# Patient Record
Sex: FEMALE | Race: WHITE | NOT HISPANIC OR LATINO | URBAN - METROPOLITAN AREA
[De-identification: names, ages, dates, MRNs, and addresses within clinical notes are randomized per-mention and may not be internally consistent; named-entity substitution may affect disease eponyms.]

---

## 2017-11-17 NOTE — PATIENT DISCUSSION
Discussed the difficulty of selecting IOL's in previous refractive surgery patients and discussed the possibility of refractive surprise and possible IOL exchange.

## 2017-11-17 NOTE — PATIENT DISCUSSION
Plan CE/IOL OS then OD +/- I-stent OU. Pt elects Custom OU Goal OD Milena,  OS -1.00 to -1.25 per [address] Po with Dr Ewelina Schreiber.

## 2017-11-27 NOTE — PATIENT DISCUSSION
Plan CE/IOL OS then OD +/- I-stent OU. Pt elects Custom OU Goal OD Milena,  OS -1.00 to -1.25 per [address] Po with Dr Franki Gibson.

## 2017-11-28 NOTE — PATIENT DISCUSSION
Plan CE/IOL OS then OD +/- I-stent OU. Pt elects Custom OU Goal OD Milena,  OS -1.00 to -1.25 per [address] Po with Dr Antoine Larkin.

## 2017-12-04 NOTE — PATIENT DISCUSSION
Plan CE/IOL OS then OD +/- I-stent OU. Pt elects Custom OU Goal OD Milena,  OS -1.00 to -1.25 per [address] Po with Dr Anjelica Ambrosio.

## 2018-02-21 NOTE — PROCEDURE NOTE: SURGICAL
Prior to commencing surgery patient identification, surgical procedure, site, and side were confirmed by Dr. Lavelle Craven. Following topical proparacaine anesthesia, the patient was positioned at the YAG laser, a contact lens coupled to the cornea with methylcellulose and an axial posterior capsulotomy performed without complication using 2.4 Mj x 46. Excess methylcellulose was washed from the eye, one drop of Alphagan was instilled and the patient returned to the holding area having tolerated the procedure well and without complication. <br />[facility]:[id number]

## 2018-03-28 NOTE — PATIENT DISCUSSION
Last 2 MRs stable, send on to [facility] for [facility] decision.

## 2019-01-18 NOTE — PATIENT DISCUSSION
Patient cleared for Epi-Lasik OD. [FreeTextEntry1] : EKG 1/18/19. Sinus rhythm. Poor R wave progression. QR complex lead 3. No acute changes.

## 2019-07-15 NOTE — PATIENT DISCUSSION
Bedside report received, pt care assumed. Pt denies any additional needs at this time, denies pain. Resting comfortably in bed. Pt anxiously awaiting available bed at [facility]. Bed in lowest position, pt educated on fall risk and verbalized understanding, call light within reach. Hourly rounding in place.    Discussed the difficulty of selecting IOL's in previous refractive surgery patients and discussed the possibility of refractive surprise and possible IOL exchange.

## 2019-11-23 NOTE — PATIENT DISCUSSION
Advised regular use of Amsler grid.
CPM. Discussed importance of compliance.
Continue current management.
Discussed AREDS 2 supplements, UV protection and green leafy vegetables.
Discussed Restasis or punctal occlusion.
Discussed condition and exacerbating conditions/situations (e.g., dry/arid environments, overhead fans, air conditioners, side effect of medications).
Discussed importance of compliance with ocular meds and follow up exams to prevent loss of vision.
Discussed increased risk with smoking.
Discussed lid hygiene, warm compress and eyelid wash.
Discussed the difficulty of selecting IOL's in previous refractive surgery patients and discussed the possibility of refractive surprise and possible IOL exchange.
Discussed the risks/benefits of YAG Laser Capsulotomy.
Discussed the risks/benefits of laser capsulotomy. Laser recommended. Patient elects to proceed.
Glasses Prescription given to patient.
Good postoperative appearance.
Instructed to call immediately if any new distortion, blurring, decreased vision or eye pain.
Monitor.
Patient cleared for Epi-Lasik OD with a goal of cinthia.
Patient understands condition, prognosis and need for follow up care.
Plan Epi-Lasik Consult with Dr Patricia Duenas.
Post op gtt instructions reviewed with patient.
Recommended artificial tears to use as directed.
Recommended artificial tears to use: 1 drop 4x a day in both eyes.
Residual prescription.
Stable.
The patient feels that the cataract is significantly impacting daily activities and has elected cataract surgery. The risks, benefits, and alternatives to surgery were discussed. The patient elects to proceed with surgery.
The types of intraocular lenses were reviewed with the patient along with a discussion of their various strengths and weaknesses.
Wants to keep the near vision he has in OS.
Well healed.
3

## 2021-01-06 NOTE — PATIENT DISCUSSION
VISUAL FIELD SHOWS WORSENING IN OU. REC: LOWER IOP OU. DISCUSSED OPTIONS. START LUMIGAN OU QHS AND SLT OU TODAY.

## 2021-01-06 NOTE — PROCEDURE NOTE: CLINICAL
PROCEDURE NOTE: SLT #1 OU. Diagnosis: POAG, Moderate. Anesthesia: Topical. Prep: Alphagan 0.15%. Prior to treatment, risks/benefits/alternatives discussed including infection, loss of vision, hemorrhage, cataract, glaucoma, retinal tears or detachment. Lens:  SLT laser lens with goniosol. Power: 1.2mJ. Total applications: 32/55. Application 907 degrees. Patient tolerated procedure well. There were no complications. Post-op instructions given. Post-op IOP = 12/12 mmHg. [place]

## 2021-05-06 NOTE — PATIENT DISCUSSION
previous VISUAL FIELD SHOWS WORSENING IN OU. REC: LOWER IOP OU. DISCUSSED OPTIONS. Continue latanoprost igtt QHS OU.

## 2022-02-22 ENCOUNTER — NEW PATIENT (OUTPATIENT)
Dept: URBAN - METROPOLITAN AREA CLINIC 4 | Facility: CLINIC | Age: 67
End: 2022-02-22

## 2022-02-22 DIAGNOSIS — H02.831: ICD-10-CM

## 2022-02-22 DIAGNOSIS — E11.9: ICD-10-CM

## 2022-02-22 DIAGNOSIS — H25.13: ICD-10-CM

## 2022-02-22 DIAGNOSIS — H40.013: ICD-10-CM

## 2022-02-22 DIAGNOSIS — H02.834: ICD-10-CM

## 2022-02-22 PROCEDURE — 92133 CPTRZD OPH DX IMG PST SGM ON: CPT

## 2022-02-22 PROCEDURE — 92004 COMPRE OPH EXAM NEW PT 1/>: CPT

## 2022-02-22 ASSESSMENT — KERATOMETRY
OD_K1POWER_DIOPTERS: 46.00
OS_K2POWER_DIOPTERS: 46.50
OD_AXISANGLE_DEGREES: 052
OS_K1POWER_DIOPTERS: 46.00
OD_K2POWER_DIOPTERS: 46.00
OS_AXISANGLE_DEGREES: 170
OS_AXISANGLE2_DEGREES: 80
OD_AXISANGLE2_DEGREES: 142

## 2022-02-22 ASSESSMENT — VISUAL ACUITY
OD_SC: 20/25-2
OD_SC: J1-2
OS_SC: 20/30-1
OU_SC: 20/25+3
OS_SC: J1
OU_SC: J1+

## 2022-02-22 ASSESSMENT — TONOMETRY
OD_IOP_MMHG: 21
OS_IOP_MMHG: 21

## 2022-02-22 NOTE — PATIENT DISCUSSION
Consider pre-op: Patient complaining of a decrease in vision consistent with cataracts. Patient instructed to test eyes individually while driving at night and during daily activities. Patient states they are not currently ready for a pre-op. Schedule 6-8 month cataract evaluation, glare check.

## 2022-02-22 NOTE — PATIENT DISCUSSION
No findings consistent with diabetic changes seen today. Re-evaluate yearly with dilated exam and documentation of A1C. Letter sent to primary care physician.

## 2022-06-02 NOTE — PATIENT DISCUSSION
Advised regular use of Amsler grid.
BCL removed with [employer].
D/C Travatan and RTC in 2 weeks for IOP check.
Discussed AREDS 2 supplements, UV protection and green leafy vegetables.
Discussed Restasis or punctal occlusion.
Discussed condition and exacerbating conditions/situations (e.g., dry/arid environments, overhead fans, air conditioners, side effect of medications).
Discussed increased risk with smoking.
Discussed lid hygiene, warm compress.
Discussed the risks/benefits of YAG Laser Capsulotomy.
Good postoperative appearance.
Instructed to call immediately if any new distortion, blurring, decreased vision or eye pain.
Monitor.
No Retinal holes, Tears or Detachments.
Patient made aware of 24/7 emergency services.
Patient understands condition, prognosis and need for follow up care.
Recommended artificial tears to use as directed.
Recommended artificial tears to use: 1 drop 4x a day in both eyes.
Residual prescription.
Retinal tear and detachment warning symptoms reviewed and patient instructed to call immediately if increasing floaters, flashes, or decreasing peripheral vision.
Stable.
Wants to keep the near vision he has in OS.
Well healed.
Detail Level: Zone
Detail Level: Detailed

## 2022-07-08 RX ORDER — LEVOCETIRIZINE DIHYDROCHLORIDE 5 MG/1
TABLET, FILM COATED ORAL
COMMUNITY
Start: 2021-08-10

## 2022-07-08 RX ORDER — ATORVASTATIN CALCIUM 10 MG/1
TABLET, FILM COATED ORAL
COMMUNITY
End: 2022-08-14

## 2022-07-08 RX ORDER — TIZANIDINE 4 MG/1
TABLET ORAL
COMMUNITY

## 2022-07-08 RX ORDER — ROPINIROLE 1 MG/1
TABLET, FILM COATED ORAL
COMMUNITY

## 2022-07-08 RX ORDER — ASPIRIN 81 MG/1
TABLET ORAL
COMMUNITY

## 2022-07-08 RX ORDER — TRAMADOL HYDROCHLORIDE 50 MG/1
TABLET ORAL
COMMUNITY
Start: 2022-01-28 | End: 2022-07-27

## 2022-07-08 RX ORDER — OMEPRAZOLE 40 MG/1
1 CAPSULE, DELAYED RELEASE ORAL
COMMUNITY

## 2022-07-08 RX ORDER — LISINOPRIL 20 MG/1
TABLET ORAL
COMMUNITY

## 2022-08-12 PROBLEM — E55.9 VITAMIN D DEFICIENCY: Status: ACTIVE | Noted: 2022-08-12

## 2022-08-12 PROBLEM — K21.9 GASTROESOPHAGEAL REFLUX DISEASE: Status: ACTIVE | Noted: 2022-08-12

## 2022-08-12 PROBLEM — I10 HYPERTENSION: Status: ACTIVE | Noted: 2022-08-12

## 2022-08-12 PROBLEM — I83.12 VARICOSE VEINS OF LEFT LOWER EXTREMITY WITH INFLAMMATION: Status: ACTIVE | Noted: 2022-08-12

## 2022-08-12 PROBLEM — G89.4 CHRONIC PAIN DISORDER: Status: ACTIVE | Noted: 2022-08-12

## 2022-08-12 PROBLEM — E78.5 HYPERLIPEMIA: Status: ACTIVE | Noted: 2022-08-12

## 2022-08-12 PROBLEM — K76.0 NAFLD (NONALCOHOLIC FATTY LIVER DISEASE): Status: ACTIVE | Noted: 2022-08-12

## 2022-08-12 PROBLEM — E11.9 TYPE 2 DIABETES MELLITUS (HCC): Status: ACTIVE | Noted: 2022-08-12

## 2022-08-12 PROBLEM — M54.42 LUMBAGO WITH SCIATICA, LEFT SIDE: Status: ACTIVE | Noted: 2022-08-12

## 2022-08-12 PROBLEM — G25.81 RESTLESS LEGS: Status: ACTIVE | Noted: 2022-08-12

## 2022-09-26 ASSESSMENT — KERATOMETRY
OD_AXISANGLE2_DEGREES: 142
OD_K1POWER_DIOPTERS: 46.00
OS_K1POWER_DIOPTERS: 46.00
OS_AXISANGLE_DEGREES: 170
OS_AXISANGLE2_DEGREES: 80
OD_AXISANGLE_DEGREES: 052
OD_K2POWER_DIOPTERS: 46.00
OS_K2POWER_DIOPTERS: 46.50

## 2022-09-27 ENCOUNTER — DIAGNOSTICS ONLY (OUTPATIENT)
Dept: URBAN - METROPOLITAN AREA CLINIC 4 | Facility: CLINIC | Age: 67
End: 2022-09-27

## 2022-09-27 DIAGNOSIS — H40.013: ICD-10-CM

## 2022-09-27 PROCEDURE — 92083 EXTENDED VISUAL FIELD XM: CPT

## 2022-10-11 ENCOUNTER — ESTABLISHED PATIENT (OUTPATIENT)
Dept: URBAN - METROPOLITAN AREA CLINIC 4 | Facility: CLINIC | Age: 67
End: 2022-10-11

## 2022-10-11 DIAGNOSIS — H25.13: ICD-10-CM

## 2022-10-11 DIAGNOSIS — H40.013: ICD-10-CM

## 2022-10-11 PROCEDURE — 99213 OFFICE O/P EST LOW 20 MIN: CPT

## 2022-10-11 PROCEDURE — 92015 DETERMINE REFRACTIVE STATE: CPT

## 2022-10-11 ASSESSMENT — KERATOMETRY
OS_K2POWER_DIOPTERS: 46.50
OD_AXISANGLE2_DEGREES: 142
OD_AXISANGLE_DEGREES: 052
OS_K1POWER_DIOPTERS: 46.00
OS_AXISANGLE_DEGREES: 170
OS_AXISANGLE2_DEGREES: 80
OD_K1POWER_DIOPTERS: 46.00
OD_K2POWER_DIOPTERS: 46.00

## 2022-10-11 ASSESSMENT — VISUAL ACUITY
OD_GLARE: 20/25-1
OS_CC: J2
OS_PH: 20/30
OS_GLARE: 20/40-2
OS_CC: 20/40
OD_CC: 20/25
OD_CC: J2

## 2022-10-11 ASSESSMENT — TONOMETRY
OS_IOP_MMHG: 19
OD_IOP_MMHG: 19

## 2022-11-03 PROBLEM — N81.10 CYSTOCELE WITH RECTOCELE: Status: ACTIVE | Noted: 2022-11-03

## 2022-11-03 PROBLEM — N81.6 CYSTOCELE WITH RECTOCELE: Status: ACTIVE | Noted: 2022-11-03

## 2022-11-03 PROBLEM — N76.2 ACUTE VULVITIS: Status: ACTIVE | Noted: 2022-11-03

## 2022-11-03 PROBLEM — N95.2 VAGINAL ATROPHY: Status: ACTIVE | Noted: 2022-11-03

## 2022-11-03 PROBLEM — N94.10 DYSPAREUNIA, FEMALE: Status: ACTIVE | Noted: 2022-11-03

## 2022-11-05 PROBLEM — R06.02 SOB (SHORTNESS OF BREATH): Status: ACTIVE | Noted: 2022-11-05

## 2022-11-05 PROBLEM — G56.20 LESION OF ULNAR NERVE: Status: ACTIVE | Noted: 2022-11-05

## 2022-11-05 PROBLEM — M25.639 STIFFNESS OF WRIST JOINT: Status: ACTIVE | Noted: 2022-11-05

## 2022-11-05 PROBLEM — R82.90 MALODOROUS URINE: Status: ACTIVE | Noted: 2022-11-05

## 2022-11-05 PROBLEM — R63.4 WEIGHT LOSS: Status: ACTIVE | Noted: 2022-11-05

## 2022-11-05 PROBLEM — S63.509A SPRAIN OF WRIST: Status: ACTIVE | Noted: 2022-11-05

## 2022-11-05 PROBLEM — M65.4 RADIAL STYLOID TENOSYNOVITIS: Status: ACTIVE | Noted: 2022-11-05

## 2022-11-05 PROBLEM — S92.009A FRACTURE OF CALCANEUS: Status: ACTIVE | Noted: 2022-11-05

## 2022-11-05 PROBLEM — G57.60 MORTON'S METATARSALGIA: Status: ACTIVE | Noted: 2017-08-01

## 2022-11-05 PROBLEM — K21.00 GERD WITH ESOPHAGITIS: Status: ACTIVE | Noted: 2022-11-05

## 2022-11-05 PROBLEM — M25.562 LEFT KNEE PAIN: Status: ACTIVE | Noted: 2022-11-05

## 2022-11-05 PROBLEM — B37.31 VAGINAL CANDIDIASIS: Status: ACTIVE | Noted: 2022-11-05

## 2022-11-05 PROBLEM — M79.673 HEEL PAIN: Status: ACTIVE | Noted: 2022-11-05

## 2022-11-05 PROBLEM — R07.9 CHEST PAIN: Status: ACTIVE | Noted: 2022-11-05

## 2022-11-05 PROBLEM — M19.049 LOCALIZED, PRIMARY OSTEOARTHRITIS OF HAND: Status: ACTIVE | Noted: 2022-11-05

## 2022-11-05 PROBLEM — S50.10XA CONTUSION OF FOREARM: Status: ACTIVE | Noted: 2022-11-05

## 2022-11-05 PROBLEM — M25.539 PAIN IN WRIST: Status: ACTIVE | Noted: 2022-11-05

## 2022-11-05 PROBLEM — L50.9 URTICARIA: Status: ACTIVE | Noted: 2022-11-05

## 2022-11-05 PROBLEM — G89.29 OTHER CHRONIC PAIN: Status: ACTIVE | Noted: 2022-11-05

## 2022-11-05 PROBLEM — M72.2 PLANTAR FASCIITIS: Status: ACTIVE | Noted: 2022-11-05

## 2022-11-05 PROBLEM — M67.40 GANGLION OF JOINT: Status: ACTIVE | Noted: 2022-11-05

## 2024-03-06 NOTE — PATIENT DISCUSSION
Advised regular use of Amsler grid.
Discussed AREDS 2 supplements, UV protection and green leafy vegetables.
Discussed Restasis or punctal occlusion.
Discussed condition and exacerbating conditions/situations (e.g., dry/arid environments, overhead fans, air conditioners, side effect of medications).
Discussed increased risk with smoking.
Discussed lid hygiene, warm compress.
Discussed the risks/benefits of YAG Laser Capsulotomy.
Good postoperative appearance.
Instructed to call immediately if any new distortion, blurring, decreased vision or eye pain.
Monitor.
No Retinal holes, Tears or Detachments.
Patient made aware of 24/7 emergency services.
Patient understands condition, prognosis and need for follow up care.
RTC for +/- SLT with Dr. Sue Bowen.
Recommended artificial tears to use as directed.
Recommended artificial tears to use: 1 drop 4x a day in both eyes.
Residual prescription.
Retinal tear and detachment warning symptoms reviewed and patient instructed to call immediately if increasing floaters, flashes, or decreasing peripheral vision.
Stable.
The IOP is above the target range.
Wants to keep the near vision he has in OS.
Well healed.
No

## 2024-10-05 NOTE — PATIENT DISCUSSION
Discussed condition and exacerbating conditions/situations (e.g., dry/arid environments, overhead fans, air conditioners, side effect of medications). EKG

## 2025-02-26 NOTE — PATIENT DISCUSSION
Problem: Adult Inpatient Plan of Care  Goal: Plan of Care Review  Outcome: Progressing     Problem: Wound  Goal: Skin Health and Integrity  Outcome: Progressing      Recommended artificial tears to use as directed.